# Patient Record
Sex: MALE | Race: AMERICAN INDIAN OR ALASKA NATIVE
[De-identification: names, ages, dates, MRNs, and addresses within clinical notes are randomized per-mention and may not be internally consistent; named-entity substitution may affect disease eponyms.]

---

## 2018-09-19 ENCOUNTER — HOSPITAL ENCOUNTER (EMERGENCY)
Dept: HOSPITAL 5 - ED | Age: 28
Discharge: HOME | End: 2018-09-19
Payer: SELF-PAY

## 2018-09-19 VITALS — DIASTOLIC BLOOD PRESSURE: 71 MMHG | SYSTOLIC BLOOD PRESSURE: 107 MMHG

## 2018-09-19 DIAGNOSIS — D64.9: Primary | ICD-10-CM

## 2018-09-19 DIAGNOSIS — E87.6: ICD-10-CM

## 2018-09-19 DIAGNOSIS — F17.200: ICD-10-CM

## 2018-09-19 LAB
BASOPHILS # (AUTO): 0 K/MM3 (ref 0–0.1)
BASOPHILS NFR BLD AUTO: 0.4 % (ref 0–1.8)
BUN SERPL-MCNC: 10 MG/DL (ref 9–20)
BUN/CREAT SERPL: 14 %
CALCIUM SERPL-MCNC: 7.9 MG/DL (ref 8.4–10.2)
EOSINOPHIL # BLD AUTO: 0 K/MM3 (ref 0–0.4)
EOSINOPHIL NFR BLD AUTO: 0.1 % (ref 0–4.3)
HCT VFR BLD CALC: 25 % (ref 35.5–45.6)
HEMOLYSIS INDEX: 5
HGB BLD-MCNC: 7.5 GM/DL (ref 11.8–15.2)
LYMPHOCYTES # BLD AUTO: 0.7 K/MM3 (ref 1.2–5.4)
LYMPHOCYTES NFR BLD AUTO: 13.7 % (ref 13.4–35)
MCH RBC QN AUTO: 20 PG (ref 28–32)
MCHC RBC AUTO-ENTMCNC: 30 % (ref 32–34)
MCV RBC AUTO: 65 FL (ref 84–94)
MONOCYTES # (AUTO): 0.6 K/MM3 (ref 0–0.8)
MONOCYTES % (AUTO): 12.3 % (ref 0–7.3)
PLATELET # BLD: 559 K/MM3 (ref 140–440)
RBC # BLD AUTO: 3.83 M/MM3 (ref 3.65–5.03)

## 2018-09-19 PROCEDURE — 86901 BLOOD TYPING SEROLOGIC RH(D): CPT

## 2018-09-19 PROCEDURE — 96365 THER/PROPH/DIAG IV INF INIT: CPT

## 2018-09-19 PROCEDURE — 86850 RBC ANTIBODY SCREEN: CPT

## 2018-09-19 PROCEDURE — 80048 BASIC METABOLIC PNL TOTAL CA: CPT

## 2018-09-19 PROCEDURE — 99283 EMERGENCY DEPT VISIT LOW MDM: CPT

## 2018-09-19 PROCEDURE — 85025 COMPLETE CBC W/AUTO DIFF WBC: CPT

## 2018-09-19 PROCEDURE — 86900 BLOOD TYPING SEROLOGIC ABO: CPT

## 2018-09-19 PROCEDURE — 36415 COLL VENOUS BLD VENIPUNCTURE: CPT

## 2018-09-19 NOTE — EMERGENCY DEPARTMENT REPORT
ED General Adult HPI





- General


Chief complaint: Recheck/Abnormal Lab/Rx


Stated complaint: BLOOD TRANSFUSION


Time Seen by Provider: 09/19/18 15:11


Source: patient


Mode of arrival: Ambulatory


Limitations: No Limitations





- History of Present Illness


Initial comments: 





7-year-old male who denies any past medical history presents stating that he 

was sent here to the emergency department by his primary care provider.  

Patient states that he was told his blood count was 6.9 and sent here for 

evaluation.  Patient has no complaints his current time.  HEENT, abdominal pain

, or shortness of breath.  Patient states that 3 weeks ago he noted blood in 

his stool and has not noted it since.  Patient denies any melena.  Patient 

denies any hematemesis.  Patient complains of epistaxis. 


Severity scale (0 -10): 0





- Related Data


 Previous Rx's











 Medication  Instructions  Recorded  Last Taken  Type


 


Ciprofloxacin HCl [Ciprofloxacin 500 mg PO BID #20 tablet 04/07/16 Unknown Rx





TAB]    


 


Phenazopyridine [Pyridium] 100 mg PO TID #6 tab 04/07/16 Unknown Rx


 


Ferrous Sulfate [Iron] 325 mg PO TID 30 Days #90 tablet 09/19/18 Unknown Rx


 


Potassium Chloride 10 meq PO QDAY #14 capsule.er 09/19/18 Unknown Rx











 Allergies











Allergy/AdvReac Type Severity Reaction Status Date / Time


 


No Known Allergies Allergy   Verified 04/06/16 20:16














ED Review of Systems


ROS: 


Stated complaint: BLOOD TRANSFUSION


Other details as noted in HPI





Constitutional: denies: chills, fever


Eyes: denies: eye pain, eye discharge, vision change


ENT: denies: ear pain, throat pain


Respiratory: denies: cough, shortness of breath, wheezing


Cardiovascular: denies: chest pain, palpitations


Endocrine: no symptoms reported


Gastrointestinal: denies: abdominal pain, nausea, diarrhea


Genitourinary: denies: urgency, dysuria


Musculoskeletal: denies: back pain, joint swelling, arthralgia


Skin: denies: rash, lesions


Neurological: denies: headache, weakness, paresthesias


Psychiatric: denies: anxiety, depression


Hematological/Lymphatic: denies: easy bleeding, easy bruising





ED Past Medical Hx





- Past Medical History


Previous Medical History?: No





- Surgical History


Additional Surgical History: hernia repair





- Social History


Smoking Status: Current Every Day Smoker


Substance Use Type: None





- Medications


Home Medications: 


 Home Medications











 Medication  Instructions  Recorded  Confirmed  Last Taken  Type


 


Ciprofloxacin HCl [Ciprofloxacin 500 mg PO BID #20 tablet 04/07/16  Unknown Rx





TAB]     


 


Phenazopyridine [Pyridium] 100 mg PO TID #6 tab 04/07/16  Unknown Rx


 


Ferrous Sulfate [Iron] 325 mg PO TID 30 Days #90 tablet 09/19/18  Unknown Rx


 


Potassium Chloride 10 meq PO QDAY #14 capsule.er 09/19/18  Unknown Rx














ED Physical Exam





- General


Limitations: No Limitations


General appearance: alert, in no apparent distress





- Head


Head exam: Present: atraumatic, normocephalic





- Eye


Eye exam: Present: normal appearance





- ENT


ENT exam: Present: mucous membranes moist





- Neck


Neck exam: Present: normal inspection





- Respiratory


Respiratory exam: Present: normal lung sounds bilaterally.  Absent: respiratory 

distress





- Cardiovascular


Cardiovascular Exam: Present: regular rate, normal rhythm.  Absent: systolic 

murmur, diastolic murmur, rubs, gallop





- GI/Abdominal


GI/Abdominal exam: Present: soft, normal bowel sounds





- Rectal


Rectal exam: Present: heme (-) stool, other (brown stool)





- Extremities Exam


Extremities exam: Present: normal inspection





- Back Exam


Back exam: Present: normal inspection





- Neurological Exam


Neurological exam: Present: alert, oriented X3





- Psychiatric


Psychiatric exam: Present: normal affect, normal mood





- Skin


Skin exam: Present: warm, dry, intact, normal color.  Absent: rash





ED Course


 Vital Signs











  09/19/18 09/19/18 09/19/18





  14:11 15:32 15:35


 


Temperature 99.4 F  98.9 F


 


Pulse Rate 122 H  91 H


 


Respiratory 18  15





Rate   


 


Blood Pressure 99/64  


 


Blood Pressure   102/62





[Left]   


 


O2 Sat by Pulse 100 100 100





Oximetry   














  09/19/18 09/19/18 09/19/18





  15:46 16:00 18:54


 


Temperature   


 


Pulse Rate   


 


Respiratory   15





Rate   


 


Blood Pressure 102/62 106/62 


 


Blood Pressure   





[Left]   


 


O2 Sat by Pulse 99 99 100





Oximetry   














ED Medical Decision Making





- Lab Data


Result diagrams: 


 09/19/18 14:45





 09/19/18 14:45





- Medical Decision Making


Patient states that he has a history of anemia and has been told to follow up 

with a hematologist but does not have insurance.  Patient has no active 

bleeding from his rectal region or any orifice.  Patient to be discharged to 

follow up with PCP and will be written a prescription for iron therapy.  

Patient also received IV potassium as well as by po potassium while in the 

emergency department we'll give 10 mEq of potassium discharge.  Patient 

currently is asymptomatic.





- Differential Diagnosis


anemia; hypokalemia; dehydration; 


Critical care attestation.: 


If time is entered above; I have spent that time in minutes in the direct care 

of this critically ill patient, excluding procedure time.








ED Disposition


Clinical Impression: 


 Anemia, Hypokalemia





Disposition: DC-01 TO HOME OR SELFCARE


Is pt being admited?: No


Condition: Stable


Instructions:  Hypokalemia (ED), Anemia (ED)


Prescriptions: 


Ferrous Sulfate [Iron] 325 mg PO TID 30 Days #90 tablet


Potassium Chloride 10 meq PO QDAY #14 capsule.er


Referrals: 


PRIMARY CARE,MD [Primary Care Provider] - 3-5 Days


Time of Disposition: 20:11